# Patient Record
(demographics unavailable — no encounter records)

---

## 2025-03-03 NOTE — PHYSICAL EXAM
[No Acute Distress] : no acute distress [Well Nourished] : well nourished [Well Developed] : well developed [Well-Appearing] : well-appearing [Normal Sclera/Conjunctiva] : normal sclera/conjunctiva [PERRL] : pupils equal round and reactive to light [EOMI] : extraocular movements intact [Normal Outer Ear/Nose] : the outer ears and nose were normal in appearance [No JVD] : no jugular venous distention [No Lymphadenopathy] : no lymphadenopathy [Supple] : supple [Thyroid Normal, No Nodules] : the thyroid was normal and there were no nodules present [No Respiratory Distress] : no respiratory distress  [No Accessory Muscle Use] : no accessory muscle use [Clear to Auscultation] : lungs were clear to auscultation bilaterally [Normal Rate] : normal rate  [Regular Rhythm] : with a regular rhythm [Normal S1, S2] : normal S1 and S2 [No Murmur] : no murmur heard [No Carotid Bruits] : no carotid bruits [No Abdominal Bruit] : a ~M bruit was not heard ~T in the abdomen [No Varicosities] : no varicosities [Pedal Pulses Present] : the pedal pulses are present [No Edema] : there was no peripheral edema [No Palpable Aorta] : no palpable aorta [No Extremity Clubbing/Cyanosis] : no extremity clubbing/cyanosis [Soft] : abdomen soft [Non Tender] : non-tender [Non-distended] : non-distended [No Masses] : no abdominal mass palpated [No HSM] : no HSM [Normal Bowel Sounds] : normal bowel sounds [No CVA Tenderness] : no CVA  tenderness [No Spinal Tenderness] : no spinal tenderness [No Joint Swelling] : no joint swelling [Grossly Normal Strength/Tone] : grossly normal strength/tone [No Rash] : no rash [Coordination Grossly Intact] : coordination grossly intact [No Focal Deficits] : no focal deficits [Normal Gait] : normal gait [Deep Tendon Reflexes (DTR)] : deep tendon reflexes were 2+ and symmetric [Normal Affect] : the affect was normal [Normal Insight/Judgement] : insight and judgment were intact

## 2025-03-03 NOTE — ASSESSMENT
[FreeTextEntry1] : pt had the Shingrix vaccine x2: He had the Flu shot in the fall.   Discussed recent blood work results I recommend f/u in 6 months.

## 2025-03-03 NOTE — HEALTH RISK ASSESSMENT
[Good] : ~his/her~  mood as  good [Never (0 pts)] : Never (0 points) [No] : In the past 12 months have you used drugs other than those required for medical reasons? No [0] : 2) Feeling down, depressed, or hopeless: Not at all (0) [Never] : Never [Patient reported colonoscopy was normal] : Patient reported colonoscopy was normal [HIV test declined] : HIV test declined [Hepatitis C test declined] : Hepatitis C test declined [de-identified] : none [Audit-CScore] : 0 [de-identified] : tennis 3-4 days a week // walks [de-identified] : overall healthy balanced and varied diet without any exclusions  [GPY1Vtens] : 0 [ColonoscopyDate] : 09/23 [ColonoscopyComments] : with Dr. Benigno Contreras- rec 5 yr

## 2025-03-03 NOTE — REVIEW OF SYSTEMS
[Recent Change In Weight] : ~T recent weight change [Negative] : Heme/Lymph [FreeTextEntry2] : lost 31 lbs over 6 months.

## 2025-03-03 NOTE — HISTORY OF PRESENT ILLNESS
[FreeTextEntry1] : Patient presents today for CPE/comprehensive medical evaluation.\par   [de-identified] : Patient has been in good overall health this past year. He lost  ~31 lbs since 8/2024 when he started Zepbound.He is now on 10 mg SQ weekly.  He is paying out of pocket for the medication. He is exercising more/ eating much healthier and focusing on his health and feels great.  He wants to get to a goal weight of ~185-190.  He has chronic difficulty with sleep--wants to see Dr. Mcdermott for sleep evaluation and to rule out sleep apnea.  His wife passed away 3/13/2020 from rare and advanced metastatic uterine leiomyosarcoma after multiple failed attempts with differing chemotherapy regimens.  His 90 yo father passed away later that year in 11/2020 after breaking his back-had a series of medical complications.  His 2 kids are doing well.  22 yo daughter is working at [employer]. HIs son is a senior at [employer]-will start working at [employer] upon graduation. He dating seriously now for the past 3 years. They enjoy tennis, golf and skiing together.  He has been busy with work. He has been healthy overall. He exercises vigorously with a Cross[employer] Train[employer] 2-3 times a week and plays tennis 2-3 times a week.